# Patient Record
Sex: MALE | Race: WHITE | NOT HISPANIC OR LATINO | Employment: FULL TIME | URBAN - NONMETROPOLITAN AREA
[De-identification: names, ages, dates, MRNs, and addresses within clinical notes are randomized per-mention and may not be internally consistent; named-entity substitution may affect disease eponyms.]

---

## 2017-07-21 ENCOUNTER — AMBULATORY - GICH (OUTPATIENT)
Dept: FAMILY MEDICINE | Facility: OTHER | Age: 22
End: 2017-07-21

## 2017-07-21 DIAGNOSIS — Z23 ENCOUNTER FOR IMMUNIZATION: ICD-10-CM

## 2017-08-01 ENCOUNTER — OFFICE VISIT - GICH (OUTPATIENT)
Dept: FAMILY MEDICINE | Facility: OTHER | Age: 22
End: 2017-08-01

## 2017-08-01 ENCOUNTER — COMMUNICATION - GICH (OUTPATIENT)
Dept: FAMILY MEDICINE | Facility: OTHER | Age: 22
End: 2017-08-01

## 2017-08-01 DIAGNOSIS — K21.00 GASTRO-ESOPHAGEAL REFLUX DISEASE WITH ESOPHAGITIS: ICD-10-CM

## 2017-08-01 DIAGNOSIS — F81.9 DEVELOPMENTAL DISORDER OF SCHOLASTIC SKILLS: ICD-10-CM

## 2017-08-02 ENCOUNTER — COMMUNICATION - GICH (OUTPATIENT)
Dept: FAMILY MEDICINE | Facility: OTHER | Age: 22
End: 2017-08-02

## 2017-08-02 DIAGNOSIS — K21.00 GASTRO-ESOPHAGEAL REFLUX DISEASE WITH ESOPHAGITIS: ICD-10-CM

## 2017-08-07 ENCOUNTER — COMMUNICATION - GICH (OUTPATIENT)
Dept: SURGERY | Facility: OTHER | Age: 22
End: 2017-08-07

## 2017-08-21 ENCOUNTER — COMMUNICATION - GICH (OUTPATIENT)
Dept: SURGERY | Facility: OTHER | Age: 22
End: 2017-08-21

## 2017-08-25 ENCOUNTER — SURGERY (OUTPATIENT)
Dept: SURGERY | Facility: OTHER | Age: 22
End: 2017-08-25

## 2017-08-25 ENCOUNTER — HOSPITAL ENCOUNTER (OUTPATIENT)
Dept: SURGERY | Facility: OTHER | Age: 22
Discharge: HOME | End: 2017-08-25
Attending: SURGERY | Admitting: SURGERY

## 2017-08-25 ENCOUNTER — HISTORY (OUTPATIENT)
Dept: SURGERY | Facility: OTHER | Age: 22
End: 2017-08-25

## 2017-08-25 ENCOUNTER — AMBULATORY - GICH (OUTPATIENT)
Dept: SCHEDULING | Facility: OTHER | Age: 22
End: 2017-08-25

## 2017-08-25 DIAGNOSIS — K22.89 OTHER SPECIFIED DISEASES OF ESOPHAGUS: ICD-10-CM

## 2017-08-28 ENCOUNTER — COMMUNICATION - GICH (OUTPATIENT)
Dept: SURGERY | Facility: OTHER | Age: 22
End: 2017-08-28

## 2017-08-29 ENCOUNTER — HISTORY (OUTPATIENT)
Dept: FAMILY MEDICINE | Facility: OTHER | Age: 22
End: 2017-08-29

## 2017-08-29 ENCOUNTER — OFFICE VISIT - GICH (OUTPATIENT)
Dept: FAMILY MEDICINE | Facility: OTHER | Age: 22
End: 2017-08-29

## 2017-08-29 DIAGNOSIS — K22.2 ESOPHAGEAL OBSTRUCTION: ICD-10-CM

## 2017-08-30 ENCOUNTER — AMBULATORY - GICH (OUTPATIENT)
Dept: FAMILY MEDICINE | Facility: OTHER | Age: 22
End: 2017-08-30

## 2017-08-30 ENCOUNTER — HOSPITAL ENCOUNTER (OUTPATIENT)
Dept: RADIOLOGY | Facility: OTHER | Age: 22
End: 2017-08-30
Attending: FAMILY MEDICINE

## 2017-08-30 DIAGNOSIS — K22.2 ESOPHAGEAL OBSTRUCTION: ICD-10-CM

## 2017-08-31 ENCOUNTER — CARE COORDINATION (OUTPATIENT)
Dept: GASTROENTEROLOGY | Facility: CLINIC | Age: 22
End: 2017-08-31

## 2017-08-31 ENCOUNTER — TELEPHONE (OUTPATIENT)
Dept: GASTROENTEROLOGY | Facility: CLINIC | Age: 22
End: 2017-08-31

## 2017-08-31 NOTE — TELEPHONE ENCOUNTER
8 Pages of medical records received on 08/30/2017.  Message sent via E-mail asking for the Intake team to begin the dot phrase.     SR 08/31/2017  [id number]

## 2017-08-31 NOTE — PROGRESS NOTES
[facility] Referral       Referring provider: Dr. Colunga, [facility]     Clinic Contact: [place]  Phone: [phone]     Referred to: [facility]     Referral Received: 8/30/17    Records received: 8/30/17 - [facility] records received via care everywhere.     Images received: not as of 09/12/17    MD review date: NA    Evaluation for: Esophageal Stricture       Clinical History (per RN review of records provided): Records in Care Everywhere     - Patient has history of difficulty swallowing dating back to age 12.     - Unable to review procedure notes but required EGD for removal of foreign body in 06/2001 and a second time as well date unknown. Once for a removal of hamburger (age 12) and the second for a removal of a angeles (age 5).    - No treatment initiated for this.     - He was seen by surgeon within [facility] system in 2014 for difficulty swallowing. Recommended proceeding with EGD.     - Upper endoscopy on 9/26/2014: Completed under MAC sedation.   Procedure: After adequate sedation the patient was in the left lateral decubitus position. The esophagoscope was passed under direct vision into the esophagus and onto the second portion of the duodenum. The duodenum was unremarkable and biopsied. The antrum was with extensive petechiae. Biopsies were taken from the antrum to look for occult H. Pylori. The scope was retroflexed. The GE junction and fundus were with linear erosions . Scope was straightened and pulled back. The distal esophagus was friable and with erythema . Biopsies were taken from the distal esophagus. The remaining esophagus was with linear mucosal tear . The scope was removed.The patient tolerated the procedure well.       - patient was seen by family medicine for follow up d/t ongoing issues with epigastric pain and reflux in 08/2017. Per office note pt had stopped his PPI medication, following visit recommendation was to restart.       - Upper  Endoscopy on 8/25/17 completed with Dr. Cuellar at [facility].   Procedure:After adequate sedation, the patient was in the left lateral decubitus position. The esophagoscope was passed under direct vision into the esophagus. There was some resistence So scope was pulled back which reveled a linear mucosal tear In mid esophagus. Tear was not through muscularis which was visible. Attempt to re-approximate mucosa with a clip was not successful as the muscularis wanted to pooch through. The scope was then removed.The patient tolerated the procedure well.    Recommendations: PPI and liquid diet.     - Following scope patient was seen for follow up with PCP, he was having pain with swallowing but since starting Nexium it had resolved per note. Endoscopy provider recommended pt have consult at [place] for further evaluation of ongoing dysphagia.       Imaging:   XR Esophagus 8/30/2017:   FINDINGS: There is no evidence of esophageal mass, stricture or ulceration. No episodes of gastroesophageal reflux were witnessed. There is normal peristalsis.      MD Decision for clinic consultation/Orders:     - ok to scheduled based on diagnosis protocol for provider.     Referral updates/Patient contacted:     08/31/2017 2:48 PM - requested intake team contact patient to schedule consult. Will need to confirm where previous endoscopy procedures have been completed and obtain outside records for visit. Also requested outside esophagram is pushed to our system for visit.

## 2017-09-22 ENCOUNTER — COMMUNICATION - GICH (OUTPATIENT)
Dept: FAMILY MEDICINE | Facility: OTHER | Age: 22
End: 2017-09-22

## 2017-09-22 DIAGNOSIS — K21.00 GASTRO-ESOPHAGEAL REFLUX DISEASE WITH ESOPHAGITIS: ICD-10-CM

## 2017-09-22 DIAGNOSIS — K22.89 OTHER SPECIFIED DISEASES OF ESOPHAGUS: ICD-10-CM

## 2017-10-03 ENCOUNTER — COMMUNICATION - GICH (OUTPATIENT)
Dept: FAMILY MEDICINE | Facility: OTHER | Age: 22
End: 2017-10-03

## 2017-10-04 ENCOUNTER — AMBULATORY - GICH (OUTPATIENT)
Dept: SCHEDULING | Facility: OTHER | Age: 22
End: 2017-10-04

## 2017-10-20 ENCOUNTER — AMBULATORY - GICH (OUTPATIENT)
Dept: FAMILY MEDICINE | Facility: OTHER | Age: 22
End: 2017-10-20

## 2017-10-20 DIAGNOSIS — Z23 ENCOUNTER FOR IMMUNIZATION: ICD-10-CM

## 2017-11-28 ENCOUNTER — AMBULATORY - GICH (OUTPATIENT)
Dept: SCHEDULING | Facility: OTHER | Age: 22
End: 2017-11-28

## 2017-12-27 NOTE — PROGRESS NOTES
Patient Information     Patient Name MRAndrey Cornejo [id number] Male 1995      Progress Notes by Roberto Carlos Cuellar MD at 2017  4:15 PM     Author:  Roberto Carlos Cuellar MD Service:  (none) Author Type:  Physician     Filed:  2017  4:16 PM Date of Service:  2017  4:15 PM Status:  Signed     :  Roberto Carlos Cuellar MD (Physician)            Screening Questions for the Scheduling of Screening Colonoscopies   (If Colonoscopy is diagnostic, Provider should review the chart before scheduling.)  Are you younger than 50 or older than 80?  YES   Do you take aspirin or fish oil?  NO  (if yes, tell patient to stop 1 week prior to Colonoscopy)  Do you take warfarin (Coumadin), clopidogrel (Plavix), apixaban (Eliquis), dabigatram (Pradaxa), rivaroxaban (Xarelto) or any blood thinner? NO  Do you use oxygen at home?  NO  Do you have kidney disease? NO  Are you on dialysis? NO  Have you had a stroke or heart attack in the last year? NO  Have you had a stent in your heart or any blood vessel in the last year? NO  Have you had a transplant of any organ? NO   Have you had a colonoscopy or upper endoscopy (EGD) before? YES - EGD          When?   ? [facility]   Date of scheduled  EGD - 2017  Provider [facility]   Pharmacy

## 2017-12-28 NOTE — TELEPHONE ENCOUNTER
Patient Information     Patient Name MRN Andrey Theodore [id number] Male 1995      Telephone Encounter by Pallavi Alicia at 2017 11:43 AM     Author:  Pallavi Alicia Service:  (none) Author Type:  (none)     Filed:  2017 11:43 AM Encounter Date:  2017 Status:  Signed     :  Pallavi Alicia            Left message to call back  ....................  2017   11:43 AM  Pallavi Alicia LPN...................2017  11:43 AM

## 2017-12-28 NOTE — TELEPHONE ENCOUNTER
Patient Information     Patient Name MRN Andrey Theodore [id number] Male 1995      Telephone Encounter by Mita Roque at 2017  4:25 PM     Author:  Mita Roque Service:  (none) Author Type:  (none)     Filed:  2017  4:25 PM Encounter Date:  2017 Status:  Signed     :  Mita Roque            Left message to call back  Mita Roque LPN..............2017 4:25 PM

## 2017-12-28 NOTE — TELEPHONE ENCOUNTER
Patient Information     Patient Name MRN Andrey Theodore [id number] Male 1995      Telephone Encounter by Millicent Desir at 2017  4:24 PM     Author:  Millicent Desir Service:  (none) Author Type:  (none)     Filed:  2017  4:25 PM Encounter Date:  2017 Status:  Signed     :  Millicent Desir            Spoke with parent she said she has this taken care of. Millicent Desir LPN .......................2017  4:25 PM

## 2017-12-28 NOTE — TELEPHONE ENCOUNTER
Patient Information     Patient Name MRAndrey Cornejo [id number] Male 1995      Telephone Encounter by Nevin Butcher at 8/3/2017  9:19 AM     Author:  Nevin Butcher Service:  (none) Author Type:  (none)     Filed:  8/3/2017  9:19 AM Encounter Date:  2017 Status:  Signed     :  Nevin Butcher            Left message to call back  ....................  8/3/2017   9:19 AM

## 2017-12-28 NOTE — OR ANESTHESIA
Patient Information     Patient Name MRN Sex Andrey Terrell [id number] Male 1995      OR Anesthesia by Paul Cummins CRNA at 2017  9:36 AM     Author:  Paul Cummins CRNA Service:  (none) Author Type:  NURS- Nurse Anesthetist     Filed:  2017  9:36 AM Date of Service:  2017  9:36 AM Status:  Signed     :  Paul Cummins CRNA (NURS- Nurse Anesthetist)                                                           ANESTHESIA ASSESSMENT    Date: 17 Time: 9:36 AM      Patient:  Andrey Sylvester    Procedure(s) (LRB):  ESOPHAGOGASTRODUODENOSCOPY (N/A)    Past Medical History:     Diagnosis  Date     LEARNING DISABILITY     Special ed for learning disorder         Past Surgical History:      Procedure  Laterality Date     ESOPHAGOGASTRODUODENOSCOPY      To remove anglees from esophagus       ESOPHAGOGASTRODUODENOSCOPY      HAMBURGER REMOVAL        ESOPHAGOGASTRODUODENOSCOPY  14    EGD       HERNIA REPAIR      Umbilical         No family history on file.    Patient Active Problem List     Diagnosis  Code     LUMBAR STRAIN S33.5XXA     Learning disabilities F81.9     ARTHRALGIA, BOTH WRISTS M25.539     Dysphagia R13.10     Gastritis K29.70     Esophagitis, reflux K21.0     Chronic back pain greater than 3 months duration M54.9, G89.29     Degeneration of intervertebral disc of lumbar region M51.36     Epigastric pain R10.13       Prescriptions Prior to Admission       Medication  Sig Dispense Refill     omeprazole (PRILOSEC) 40 mg Delayed-Release capsule Take 1 capsule by mouth once daily. OTC 90 capsule 3     Patients Unique Medication From Home Over counter relief  Chew johanny nicole by [employer]         Allergies:No Known Allergies    Review of Systems:  GERD: Yes (not currently having)  Chest pain: No  Shortness of breath: No  Recent fever: No  Poor exercise tolerance: No  Bleeding tendency: No  Pregnant: No  Anesthesia Complications: None      History    Smoking  Status      Never Smoker   Smokeless Tobacco      Never Used     Social History     Social History        Marital status:  Single     Spouse name: N/A     Number of children:  N/A     Years of education:  N/A     Social History Main Topics       Smoking status: Never Smoker     Smokeless tobacco: Never Used     Alcohol use Not on file     Drug use: Not on file     Sexual activity: Not on file     Other Topics  Concern     Not on file      Social History Narrative     Lives with his mother and at other times with his grandparents. ~Charity Sylvester Mother        Preloaded.  1/25/2013               Physical Examination:  /78  Pulse 72  Temp 98.8  F (37.1  C)  Resp 18  SpO2 100% There is no height or weight on file to calculate BMI. There is no height or weight on file to calculate BSA.  Dental Condition: Good     Mallampati Score (Airway): I  Cardiovascular: Normal  Pulmonary: Normal  Other: (not recorded)    Recent Labs in [facility]:    No results for input(s): SODIUM, POTASSIUM, CHLORIDE, SZ2LNGZI, ANIONGAP, BUN, CREATININE, BUNCREARATIO, CALCIUM, GLUCOSE, GLUCOSEMETER, KETONES, MAGNESIUM, WBC, HGB, HCT, PLT, ABORH, RHTYPE, PREGURINE, BHCGQL, HCGBETAQUANT, INR in the last 72 hours.          Assessment/Plan:  ASA Class: I  Risk of dental injury discussed: Yes  NPO status confirmed: Yes  Anesthetic Plan: MAC  Risk/Benefit/Alt discussed: Yes  Questions answered: Yes  Emergency Case?: No  Labs/ECG/Radiology Reviewed?: Yes      H&P Reviewed.  Patient Examined.      Provider Electronic Signature:  Paul Cummins CRNA

## 2017-12-28 NOTE — TELEPHONE ENCOUNTER
Patient Information     Patient Name MRN Andrey Theodore [id number] Male 1995      Telephone Encounter by Irasema King at 10/4/2017 12:13 PM     Author:  Irasema King Service:  (none) Author Type:  (none)     Filed:  10/4/2017 12:13 PM Encounter Date:  10/3/2017 Status:  Signed     :  Irasema King            PA was done and faxed  Irasema King ....................  10/4/2017   12:13 PM

## 2017-12-28 NOTE — PROGRESS NOTES
"Patient Information     Patient Name MRN Sex Andrey Terrell [id number] Male 1995      Progress Notes by Didier Colunga MD at 2017  3:15 PM     Author:  Didier Colunga MD Service:  (none) Author Type:  Physician     Filed:  2017  3:51 PM Encounter Date:  2017 Status:  Signed     :  Didier Colunga MD (Physician)            SUBJECTIVE:    Andrey Sylvester is a 22 y.o. male who presents for abdomen pain and gastroesophogeal reflux disease     HPI    Here by himself.  He is now working at [employer].  Had been on medications for 1 year or so, stopped it and is just on alkaseltzer now.  He continues to have gastroesophogeal reflux disease symptoms perhaps 2 times a week.  Had an EGD done and reports he had esophagitis at the time.  His mom has encouraged him to have a repeat to make sure it has healed.  Per our records his last EGD was .  He does not exactly recall the specifics, has a moderate learning disability.  Still will get spells of \"bad\" pain in epigastrium, perhaps every other month or so for a few weeks.  Some foods help, like low Sodium foods.  Worsened by processed foods, hot dogs and fast food.  Will then also get diarrhea      No Known Allergies,   Current Outpatient Prescriptions on File Prior to Visit       Medication  Sig Dispense Refill     famotidine (PEPCID) 40 mg tablet Take 1 tablet by mouth at bedtime.  0     No current facility-administered medications on file prior to visit.    ,   Past Medical History:     Diagnosis  Date     LEARNING DISABILITY     Special ed for learning disorder      and   Past Surgical History:      Procedure  Laterality Date     ESOPHAGOGASTRODUODENOSCOPY      To remove angeles from esophagus       ESOPHAGOGASTRODUODENOSCOPY      HAMBURGER REMOVAL        ESOPHAGOGASTRODUODENOSCOPY  15    EGD       HERNIA REPAIR      Umbilical         REVIEW OF SYSTEMS:  Review of Systems   Constitutional: Negative for chills and fever.   Respiratory: " "Negative for cough and shortness of breath.    Cardiovascular: Negative for chest pain and palpitations.   Gastrointestinal: Positive for abdominal pain, diarrhea and heartburn. Negative for constipation, nausea and vomiting.   Neurological: Negative for headaches.       OBJECTIVE:  /64  Pulse 80  Ht 1.854 m (6' 1\")  Wt 85.9 kg (189 lb 6 oz)  BMI 24.99 kg/m2    EXAM:   Physical Exam   Constitutional: He is well-developed, well-nourished, and in no distress. No distress.   HENT:   Head: Normocephalic and atraumatic.   Cardiovascular: Normal rate, regular rhythm and normal heart sounds.  Exam reveals no gallop and no friction rub.    No murmur heard.  Pulmonary/Chest: Effort normal and breath sounds normal. No respiratory distress. He has no wheezes. He has no rales.   Abdominal: Soft. He exhibits no distension. There is no tenderness. There is no rebound and no guarding.   Skin: Skin is warm and dry. No rash noted. He is not diaphoretic. No erythema.   Psychiatric: Affect and judgment normal.       ASSESSMENT/PLAN:    ICD-10-CM    1. Esophagitis, reflux K21.0 GASTROSCOPY/EGD - [facility]   2. Learning disabilities F81.9         Plan:  I looked over the report from his last EGD, 3 years ago.  Symptoms continue so will repeat this and also have him resume PROTON PUMP INHIBITOR treatment until it is done.  Discussed with him diet changes.  His grandmother cooks for him, as he lives with her.  Also limit EtOH.  Didier Colunga MD ....................  8/1/2017   3:50 PM            "

## 2017-12-28 NOTE — OR SURGEON
Patient Information     Patient Name MRN Andrey Theodore [id number] Male 1995      OR Surgeon by Roberto Carlos Cuellar MD at 2017 10:08 AM     Author:  Roberto Carlos Cuellar MD Service:  (none) Author Type:  Physician     Filed:  2017 10:14 AM Date of Service:  2017 10:08 AM Status:  Signed     :  Roberto Carlos Cuellar MD (Physician)            Procedure note  Date of service: 2017    Preoperative diagnosis:Epigastric pain and GERD  Postoperative diagnosis: Esophageal mucosal tear    Procedure:  Attempted EGD    Anesthesia:   GABBY Cummins CRNA    Indication for the procedure:This is a 22 y.o. male with h/o gastritis and GERD with worsening symptoms.  After explaining the risks to include bleeding, aspiration, perforation, the patient wished to proceed.    Procedure:After adequate sedation, the patient was in the left lateral decubitus position.  The esophagoscope was passed under direct vision into the esophagus. There was some resistence  So scope was pulled back which reveled a  linear mucosal tear  In mid esophagus. Tear was not through muscularis which was visible. Attempt to re-approximate mucosa with a clip was not successful as the muscularis wanted to pooch through.   The scope was then  removed.The patient tolerated the procedure well.    Recommendations:    PPI and liquid diet.     Surgeon: Roberto Carlos Cuellar MD [facility]    Referring physician:  Didier Colunga MD

## 2017-12-28 NOTE — TELEPHONE ENCOUNTER
Patient Information     Patient Name MRN Andrey Theodore [id number] Male 1995      Telephone Encounter by Didier Colunga MD at 2017  3:06 PM     Author:  Didier Colunga MD Service:  (none) Author Type:  Physician     Filed:  2017  3:06 PM Encounter Date:  2017 Status:  Signed     :  Didier Colunga MD (Physician)            Tyrone patient, needs to see the specialist based on Dr. Cuellar's procedure, even with a normal study.  Also, tell him he will have to sign the release yearly.  Didier Colunga MD ....................  2017   3:06 PM

## 2017-12-28 NOTE — TELEPHONE ENCOUNTER
Patient Information     Patient Name MRN Andrey Theodore [id number] Male 1995      Telephone Encounter by Fadia Nick at 2017  1:10 PM     Author:  Fadia Nick Service:  (none) Author Type:  (none)     Filed:  2017  1:10 PM Encounter Date:  2017 Status:  Signed     :  Fadia Nick            2nd phone note opened for this referral.  Fadia Nick LPN....................2017 1:10 PM

## 2017-12-28 NOTE — TELEPHONE ENCOUNTER
Patient Information     Patient Name MRN Andrey Theodore [id number] Male 1995      Telephone Encounter by Irasema King at 2017 11:38 AM     Author:  Irasema King Service:  (none) Author Type:  (none)     Filed:  2017 11:44 AM Encounter Date:  2017 Status:  Signed     :  Irasema King            Radiology doctor told Pt that his study looked fine and he asked why the patient needs to go to the  of . Parent did not know the name of the surgeon who saw something, wondering why. really doesn't need to go

## 2017-12-28 NOTE — TELEPHONE ENCOUNTER
Patient Information     Patient Name MRN Andrey Theodore [id number] Male 1995      Telephone Encounter by Tammie Lara at 2017 12:43 PM     Author:  Tammie Lara Service:  (none) Author Type:  (none)     Filed:  2017 12:44 PM Encounter Date:  2017 Status:  Signed     :  Tammie Lara            Patient referred by Dr. Colunga for a EDG, Diagnosis is esophagitis , reflux .   Please advise. Thank You

## 2017-12-28 NOTE — TELEPHONE ENCOUNTER
Patient Information     Patient Name MRN Andrey Theodore [id number] Male 1995      Telephone Encounter by Millicent Desir at 2017 10:29 AM     Author:  Millicent Desir Service:  (none) Author Type:  (none)     Filed:  2017 10:43 AM Encounter Date:  2017 Status:  Signed     :  Millicent Desir            Spoke with patient he would like a refill of liquid antacid. Spoke with pharmacist at [facility], patient was given omeprazole capsules with directions to open capsule and put in liquid. A PA was completed for this medication. Did leave mom a message for a return call. Patient is not yet due for refill.  Millicent Desir LPN .......................2017  10:42 AM

## 2017-12-28 NOTE — TELEPHONE ENCOUNTER
Patient Information     Patient Name MRN Andrey Theodore [id number] Male 1995      Telephone Encounter by Pallavi Alicia at 2017  2:27 PM     Author:  Pallavi Alicia Service:  (none) Author Type:  (none)     Filed:  2017  2:33 PM Encounter Date:  2017 Status:  Signed     :  Pallavi Alicia            Patient's mother calling in regards to letter that they received stating his swallow study was normal. She is wondering why patient still needs to see a specialist. I stated that we have nothing of file stating it is okay for us to speak with her about his medical care because he is over the age of 19. I stated I can ask Dr. Colunga, however I will have to contact the patient to ask if it is okay that I speak with her. She argued that he has done this before and it should be on file. Unfortunately, I can not find anything and will proceed with calling patient to ask permission.    Pallavi Alicia LPN...................2017  2:33 PM

## 2017-12-28 NOTE — OR POSTOP
Patient Information     Patient Name MRN Sex Andrey Terrell [id number] Male 1995      OR PostOp by Silvia Garcia RN at 2017 11:33 AM     Author:  Silvia Garcia RN Service:  (none) Author Type:  NURS- Registered Nurse     Filed:  2017 11:34 AM Date of Service:  2017 11:33 AM Status:  Signed     :  Silvia Garcia RN (NURS- Registered Nurse)            Discharge Note    Data:  Andrey Sylvester has been discharged home at 1115 via ambulatory accompanied by Registered Nurse and Family.      Action:  Written discharge/follow-up instructions were provided to patient and Mother. Prescriptions were sent to patients pharmacy.  Belongings sent with patient. Medications from home sent with patient/family: Not Applicable  Equipment none .     Response:  Patient and Mother verbalized understanding of discharge instructions, reason for discharge, and necessary follow-up appointments.

## 2017-12-28 NOTE — INTERVAL H&P NOTE
"Patient Information     Patient Name MRN Andrey Theodore [id number] Male 1995      Interval H&P Note by Roberto Carlos Cuellar MD at 2017  9:21 AM     Author:  Roberto Carlos Cuellar MD Service:  (none) Author Type:  Physician     Filed:  2017  9:21 AM Date of Service:  2017  9:21 AM Status:  Signed     :  Roberto Carlos Cuellar MD (Physician)            History and Physical Update    The history and physical has been reviewed and the patient has been examined.  There are no interim changes to the patient's history or physical condition.    Roberto Carlos Cuellar MD        Source Note     Author:  Roberto Carlos Cuellar MD Service:  (none) Author Type:  Physician    Filed:  2017  4:16 PM Date of Service:  2017  3:15 PM Status:  Signed    :  Roberto Carlos Cuellar MD (Physician)              SUBJECTIVE:    Andrye Sylvester is a 22 y.o. male who presents for abdomen pain and gastroesophogeal reflux disease     HPI    Here by himself.  He is now working at [employer].  Had been on medications for 1 year or so, stopped it and is just on alkaseltzer now.  He continues to have gastroesophogeal reflux disease symptoms perhaps 2 times a week.  Had an EGD done and reports he had esophagitis at the time.  His mom has encouraged him to have a repeat to make sure it has healed.  Per our records his last EGD was .  He does not exactly recall the specifics, has a moderate learning disability.  Still will get spells of \"bad\" pain in epigastrium, perhaps every other month or so for a few weeks.  Some foods help, like low Sodium foods.  Worsened by processed foods, hot dogs and fast food.  Will then also get diarrhea      No Known Allergies,   Current Outpatient Prescriptions on File Prior to Visit       Medication  Sig Dispense Refill     famotidine (PEPCID) 40 mg tablet Take 1 tablet by mouth at bedtime.  0     No current facility-administered medications on file prior to visit.    ,   Past Medical History:     Diagnosis  Date     " "LEARNING DISABILITY     Special ed for learning disorder      and   Past Surgical History:      Procedure  Laterality Date     ESOPHAGOGASTRODUODENOSCOPY      To remove angeles from esophagus       ESOPHAGOGASTRODUODENOSCOPY      HAMBURGER REMOVAL        ESOPHAGOGASTRODUODENOSCOPY  9/26/14    EGD       HERNIA REPAIR      Umbilical         REVIEW OF SYSTEMS:  Review of Systems   Constitutional: Negative for chills and fever.   Respiratory: Negative for cough and shortness of breath.    Cardiovascular: Negative for chest pain and palpitations.   Gastrointestinal: Positive for abdominal pain, diarrhea and heartburn. Negative for constipation, nausea and vomiting.   Neurological: Negative for headaches.       OBJECTIVE:  /64  Pulse 80  Ht 1.854 m (6' 1\")  Wt 85.9 kg (189 lb 6 oz)  BMI 24.99 kg/m2    EXAM:   Physical Exam   Constitutional: He is well-developed, well-nourished, and in no distress. No distress.   HENT:   Head: Normocephalic and atraumatic.   Cardiovascular: Normal rate, regular rhythm and normal heart sounds.  Exam reveals no gallop and no friction rub.    No murmur heard.  Pulmonary/Chest: Effort normal and breath sounds normal. No respiratory distress. He has no wheezes. He has no rales.   Abdominal: Soft. He exhibits no distension. There is no tenderness. There is no rebound and no guarding.   Skin: Skin is warm and dry. No rash noted. He is not diaphoretic. No erythema.   Psychiatric: Affect and judgment normal.       ASSESSMENT/PLAN:    ICD-10-CM    1. Esophagitis, reflux K21.0 GASTROSCOPY/EGD - [facility]   2. Learning disabilities F81.9         Plan:  I looked over the report from his last EGD, 3 years ago.  Symptoms continue so will repeat this and also have him resume PROTON PUMP INHIBITOR treatment until it is done.  Discussed with him diet changes.  His grandmother cooks for him, as he lives with her.  Also limit EtOH.  Didier Colunga MD ....................  8/1/2017   3:50 PM                "

## 2017-12-28 NOTE — TELEPHONE ENCOUNTER
Patient Information     Patient Name MRN Andrey Theodore [id number] Male 1995      Telephone Encounter by Irasema King at 2017 10:03 AM     Author:  Irasema King Service:  (none) Author Type:  (none)     Filed:  2017 10:04 AM Encounter Date:  2017 Status:  Signed     :  Irasema King            Calling and asking why he would need to go to the [facility], and needs refill of acid reflux in a liquid  Form  Irasema King ....................  2017   10:04 AM

## 2017-12-28 NOTE — OR ANESTHESIA
Patient Information     Patient Name MRN Sex Andrey Terrell [id number] Male 1995      OR Anesthesia by Paul Cummins CRNA at 2017 11:36 AM     Author:  Paul Cummins CRNA Service:  (none) Author Type:  NURS- Nurse Anesthetist     Filed:  2017 11:36 AM Date of Service:  2017 11:36 AM Status:  Signed     :  Paul Cummins CRNA (NURS- Nurse Anesthetist)            Anesthesia Post Operative Care Note    Name: Andrey Sylvester  MRN:   [id number]  :    1995       Procedure Done:  See Surgeon Note   Case Cancelled for Anesthetic Reason:  No      Anesthesia Technique    Anesthetic Type:  MAC       MAC Type:  NC     Oral Trauma:  No    Intraoperative Course   Hemodynamics:  Stable    Ventilation Normal:  Yes Lung Sounds:  Normal      PACU Course        Nondepolarizer Used:       Reversed: N/A   Hemodynamics:  Stable      Hydration: Euvolemic   Temperature:  36.1 - 38.3      Mental Status:  Awake, alert, follows commands   Pain Management:  Adequate   Regional Block:  No   Anesthesia Complications:  None      Vital Signs:  Temp: 98.6  F (37  C)  Pulse: 74  BP: 151/92  Resp: 16  SpO2: 96 %                       Active Lines:  Patient Lines/Drains/Airways Status    Active Line     Name: Placement date: Placement time: Site: Days:    PERIPHERAL VAD Left Hand 17   0940   Hand   less than 1                Intake & Output:       Labs:  No results for input(s): GW3POAXLAXY, MMR3OAEWHEVU, PHARTERIAL, QPE0YQZWMLOX, Y4GHTKBCTUDG in the last 24 hours.    No results for input(s): MAGNESIUM in the last 24 hours.    No results for input(s): GLUCOSEMETER in the last 720 hours.        Paul Cummins CRNA ....................  2017   11:36 AM

## 2017-12-28 NOTE — PROGRESS NOTES
Patient Information     Patient Name MRN Sex Andrey Terrell [id number] Male 1995      Progress Notes by Didier Colunga MD at 2017 10:15 AM     Author:  Didier Colunga MD Service:  (none) Author Type:  Physician     Filed:  2017 10:36 AM Encounter Date:  2017 Status:  Signed     :  Didier Colunga MD (Physician)            SUBJECTIVE:    Andrey Sylvester is a 22 y.o. male who presents for follow up EGD     HPI    Here with his mom.  He says since the scope he has had mild pain with swallowing.  No carla sticking of food.  Has daily burning pain.  since starting the Nexium the gastroesophogeal reflux disease pain has resolved.  At age 14 was having pains, avoided lots of foods and lost weight.  Had his fist EGD then.  Mom wonders if this might be something that has been present ever longer.  He had swallowed a angeles at age 5, which was removed with a scope.  At age 12 while eating burger he had a choking spell.  This too had to be physically removed with a scope.  It seems the gastroesophogeal reflux disease burning symptoms came on later, at age 14 or so.  Since then he still needs his pills crushed, has to drink large amounts of water with meals and eats soft foods almost exclusively. Mom says he has been losing weight again.    He had a follow up EGD last week, and the mucosa was torn by the scope.  Dr. Cuellar was not able to complete the scope, attempted to clip it, but was not able to do this.  He advised the patient have a GI consult at a [facility] center.    No Known Allergies,   Current Outpatient Prescriptions on File Prior to Visit       Medication  Sig Dispense Refill     Esomeprazole Magnesium 40 mg grps Omeprazole or Esomeprazole 40 mg suspension  PO daily 30 Each 1     Patients Unique Medication From Home Over counter relief  Chew johanny nicole by [employer]       No current facility-administered medications on file prior to visit.    ,   Past Medical History:      Diagnosis  Date     LEARNING DISABILITY     Special ed for learning disorder      and   Past Surgical History:      Procedure  Laterality Date     ESOPHAGOGASTRODUODENOSCOPY      To remove angeles from esophagus       ESOPHAGOGASTRODUODENOSCOPY      HAMBURGER REMOVAL        ESOPHAGOGASTRODUODENOSCOPY  9/26/14    EGD       ESOPHAGOGASTRODUODENOSCOPY  08/25/2017    Attempted EGDstopped for esophageal mucosal tear       HERNIA REPAIR      Umbilical         REVIEW OF SYSTEMS:  Review of Systems   Constitutional: Positive for weight loss.   Gastrointestinal: Positive for abdominal pain and heartburn.   Neurological: Negative for dizziness.       OBJECTIVE:  /60  Resp 16  Wt 86.3 kg (190 lb 3.2 oz)  BMI 25.09 kg/m2    EXAM:   Physical Exam   Constitutional: He is oriented to person, place, and time. No distress.   Pulmonary/Chest: Effort normal and breath sounds normal. No respiratory distress. He has no wheezes. He has no rales.   Abdominal: Soft. He exhibits no distension. There is no tenderness. There is no rebound and no guarding.   Neurological: He is alert and oriented to person, place, and time.   Skin: He is not diaphoretic.       ASSESSMENT/PLAN:    ICD-10-CM    1. Esophageal stricture K22.2 XR ESOPHAGUS      AMB CONSULT TO GASTROENTEROLOGY        Plan:  It is possible this is a stricture from birth, given the long standing history of several choking spells.  Perhaps has a stricture from birth and not  Schatzki's ring.  Might even have a combination of both.  Will arrange for a GI consult at the [facility].  20/25 minutes in counseling with him and his mom over the possible causes.  She is worried about cancer, which would be very rare at his age, or given 18 years of possible dysphagia.    Didier Colunga MD ....................  8/29/2017   10:33 AM

## 2017-12-28 NOTE — TELEPHONE ENCOUNTER
Patient Information     Patient Name MRN Andrey Theodore E [id number] Male 1995      Telephone Encounter by Jodie Morrison at 2017  2:50 PM     Author:  Jodie Morrison Service:  (none) Author Type:  (none)     Filed:  2017  2:56 PM Encounter Date:  2017 Status:  Signed     :  Jodie Morrison            Omeprazole 40 mg is not cover by insurance company. However Omeprazole 20 mg 2 tabs daily would be covered. Medication americo ed-up.   Jodie Morrison LPN ..........2017 2:55 PM

## 2017-12-28 NOTE — TELEPHONE ENCOUNTER
Patient Information     Patient Name MRN Andrey Theodore E [id number] Male 1995      Telephone Encounter by Roberto Carlos Cuellar MD at 2017  1:25 PM     Author:  Roberto Carlos Cuellar MD Service:  (none) Author Type:  Physician     Filed:  2017  1:25 PM Encounter Date:  2017 Status:  Signed     :  Roberto Carlos Cuellar MD (Physician)            Schedule EGD for epigastric pain

## 2017-12-28 NOTE — TELEPHONE ENCOUNTER
Patient Information     Patient Name MRN Andrey Theodore [id number] Male 1995      Telephone Encounter by Didier Colunga MD at 2017 12:45 PM     Author:  Didier Colunga MD Service:  (none) Author Type:  Physician     Filed:  2017 12:47 PM Encounter Date:  2017 Status:  Signed     :  Didier Colunga MD (Physician)            I am just going by what Dr. Cuellar advised, based on what he saw on the scope.  The test done by the radiologist was to look for narrowing, but not the reason why his esophagus tore so easily.  If they do not want to go to [facility], I can arrange something in [place], or he can come back and talk to me about all of this.  Should bring his mom with.  Didier Colunga MD ....................  2017   12:46 PM

## 2017-12-28 NOTE — TELEPHONE ENCOUNTER
Patient Information     Patient Name MRN Andrey Theodore [id number] Male 1995      Telephone Encounter by Irasema King at 2017  9:58 AM     Author:  Irasema King Service:  (none) Author Type:  (none)     Filed:  2017  9:59 AM Encounter Date:  2017 Status:  Signed     :  Irasema King            They would like to go to [place] for Pt's appointment  Irasema King ....................  2017   9:58 AM

## 2017-12-28 NOTE — H&P
"Patient Information     Patient Name MRN Sex Andrey Terrell [id number] Male 1995      H&P (View-Only) by Roberto Carlos Cuellar MD at 2017  3:15 PM     Author:  Roberto Carlos Cuellar MD Service:  (none) Author Type:  Physician     Filed:  2017  4:16 PM Date of Service:  2017  3:15 PM Status:  Signed     :  Roberto Carlos Cuellar MD (Physician)            SUBJECTIVE:    Andrey Sylvester is a 22 y.o. male who presents for abdomen pain and gastroesophogeal reflux disease     HPI    Here by himself.  He is now working at [employer].  Had been on medications for 1 year or so, stopped it and is just on alkaseltzer now.  He continues to have gastroesophogeal reflux disease symptoms perhaps 2 times a week.  Had an EGD done and reports he had esophagitis at the time.  His mom has encouraged him to have a repeat to make sure it has healed.  Per our records his last EGD was .  He does not exactly recall the specifics, has a moderate learning disability.  Still will get spells of \"bad\" pain in epigastrium, perhaps every other month or so for a few weeks.  Some foods help, like low Sodium foods.  Worsened by processed foods, hot dogs and fast food.  Will then also get diarrhea      No Known Allergies,   Current Outpatient Prescriptions on File Prior to Visit       Medication  Sig Dispense Refill     famotidine (PEPCID) 40 mg tablet Take 1 tablet by mouth at bedtime.  0     No current facility-administered medications on file prior to visit.    ,   Past Medical History:     Diagnosis  Date     LEARNING DISABILITY     Special ed for learning disorder      and   Past Surgical History:      Procedure  Laterality Date     ESOPHAGOGASTRODUODENOSCOPY      To remove angeles from esophagus       ESOPHAGOGASTRODUODENOSCOPY      HAMBURGER REMOVAL        ESOPHAGOGASTRODUODENOSCOPY  14    EGD       HERNIA REPAIR      Umbilical         REVIEW OF SYSTEMS:  Review of Systems   Constitutional: Negative for chills and fever.   Respiratory: " "Negative for cough and shortness of breath.    Cardiovascular: Negative for chest pain and palpitations.   Gastrointestinal: Positive for abdominal pain, diarrhea and heartburn. Negative for constipation, nausea and vomiting.   Neurological: Negative for headaches.       OBJECTIVE:  /64  Pulse 80  Ht 1.854 m (6' 1\")  Wt 85.9 kg (189 lb 6 oz)  BMI 24.99 kg/m2    EXAM:   Physical Exam   Constitutional: He is well-developed, well-nourished, and in no distress. No distress.   HENT:   Head: Normocephalic and atraumatic.   Cardiovascular: Normal rate, regular rhythm and normal heart sounds.  Exam reveals no gallop and no friction rub.    No murmur heard.  Pulmonary/Chest: Effort normal and breath sounds normal. No respiratory distress. He has no wheezes. He has no rales.   Abdominal: Soft. He exhibits no distension. There is no tenderness. There is no rebound and no guarding.   Skin: Skin is warm and dry. No rash noted. He is not diaphoretic. No erythema.   Psychiatric: Affect and judgment normal.       ASSESSMENT/PLAN:    ICD-10-CM    1. Esophagitis, reflux K21.0 GASTROSCOPY/EGD - [facility]   2. Learning disabilities F81.9         Plan:  I looked over the report from his last EGD, 3 years ago.  Symptoms continue so will repeat this and also have him resume PROTON PUMP INHIBITOR treatment until it is done.  Discussed with him diet changes.  His grandmother cooks for him, as he lives with her.  Also limit EtOH.  Didier Colunga MD ....................  8/1/2017   3:50 PM              "

## 2017-12-28 NOTE — TELEPHONE ENCOUNTER
Patient Information     Patient Name MRN Andrey Theodore E [id number] Male 1995      Telephone Encounter by Fadia Nick at 2017  3:06 PM     Author:  Fadia Nick Service:  (none) Author Type:  (none)     Filed:  2017  3:06 PM Encounter Date:  2017 Status:  Signed     :  Fadia Nick            Left message to call back  Fadia Nick LPN ...................  2017   3:06 PM

## 2017-12-28 NOTE — TELEPHONE ENCOUNTER
Patient Information     Patient Name MRN Andrey Theodore [id number] Male 1995      Telephone Encounter by Pallavi Alicia at 2017  3:41 PM     Author:  Pallavi Alicia Service:  (none) Author Type:  (none)     Filed:  2017  3:41 PM Encounter Date:  2017 Status:  Signed     :  Pallavi Alicia            Left message to call back  ....................  2017   3:41 PM  Pallavi Alicia LPN...................2017  3:41 PM

## 2017-12-28 NOTE — TELEPHONE ENCOUNTER
Patient Information     Patient Name MRN Andrey Theodore [id number] Male 1995      Telephone Encounter by Didier Colunga MD at 2017 10:07 AM     Author:  Didier Colunga MD Service:  (none) Author Type:  Physician     Filed:  2017 10:08 AM Encounter Date:  2017 Status:  Signed     :  Didier Colunga MD (Physician)            The surgeon advised the [facility], based on what he saw with the scope.  If this is not possible, we can arrange something in [place].  Didier Colunga MD ....................  2017   10:08 AM

## 2017-12-28 NOTE — OR NURSING
Patient Information     Patient Name MRN Kimberly Sylvester, Andrey E [id number] Male 1995      OR Nursing by Fadia Bermudez RN at 2017  9:49 AM     Author:  Fadia Bermudez RN Service:  (none) Author Type:  NURS- Registered Nurse     Filed:  2017  9:49 AM Date of Service:  2017  9:49 AM Status:  Signed     :  Fadia Bermudez RN (NURS- Registered Nurse)            Received preop report from Cassidy Walsh RN.

## 2017-12-29 NOTE — H&P
"Patient Information     Patient Name MRN Andrey Theodore [id number] Male 1995      H&P by Roberto Carlos Cuellar MD at 2017  3:15 PM     Author:  Roberto Carlos Cuellar MD Service:  (none) Author Type:  Physician     Filed:  2017  4:16 PM Encounter Date:  2017 Status:  Signed     :  Roberto Carlos Cuellar MD (Physician)            SUBJECTIVE:    Andrey Sylvester is a 22 y.o. male who presents for abdomen pain and gastroesophogeal reflux disease     HPI    Here by himself.  He is now working at [employer].  Had been on medications for 1 year or so, stopped it and is just on alkaseltzer now.  He continues to have gastroesophogeal reflux disease symptoms perhaps 2 times a week.  Had an EGD done and reports he had esophagitis at the time.  His mom has encouraged him to have a repeat to make sure it has healed.  Per our records his last EGD was .  He does not exactly recall the specifics, has a moderate learning disability.  Still will get spells of \"bad\" pain in epigastrium, perhaps every other month or so for a few weeks.  Some foods help, like low Sodium foods.  Worsened by processed foods, hot dogs and fast food.  Will then also get diarrhea      No Known Allergies,   Current Outpatient Prescriptions on File Prior to Visit       Medication  Sig Dispense Refill     famotidine (PEPCID) 40 mg tablet Take 1 tablet by mouth at bedtime.  0     No current facility-administered medications on file prior to visit.    ,   Past Medical History:     Diagnosis  Date     LEARNING DISABILITY     Special ed for learning disorder      and   Past Surgical History:      Procedure  Laterality Date     ESOPHAGOGASTRODUODENOSCOPY      To remove angeles from esophagus       ESOPHAGOGASTRODUODENOSCOPY      HAMBURGER REMOVAL        ESOPHAGOGASTRODUODENOSCOPY  14    EGD       HERNIA REPAIR      Umbilical         REVIEW OF SYSTEMS:  Review of Systems   Constitutional: Negative for chills and fever.   Respiratory: Negative for cough and " "shortness of breath.    Cardiovascular: Negative for chest pain and palpitations.   Gastrointestinal: Positive for abdominal pain, diarrhea and heartburn. Negative for constipation, nausea and vomiting.   Neurological: Negative for headaches.       OBJECTIVE:  /64  Pulse 80  Ht 1.854 m (6' 1\")  Wt 85.9 kg (189 lb 6 oz)  BMI 24.99 kg/m2    EXAM:   Physical Exam   Constitutional: He is well-developed, well-nourished, and in no distress. No distress.   HENT:   Head: Normocephalic and atraumatic.   Cardiovascular: Normal rate, regular rhythm and normal heart sounds.  Exam reveals no gallop and no friction rub.    No murmur heard.  Pulmonary/Chest: Effort normal and breath sounds normal. No respiratory distress. He has no wheezes. He has no rales.   Abdominal: Soft. He exhibits no distension. There is no tenderness. There is no rebound and no guarding.   Skin: Skin is warm and dry. No rash noted. He is not diaphoretic. No erythema.   Psychiatric: Affect and judgment normal.       ASSESSMENT/PLAN:    ICD-10-CM    1. Esophagitis, reflux K21.0 GASTROSCOPY/EGD - [facility]   2. Learning disabilities F81.9         Plan:  I looked over the report from his last EGD, 3 years ago.  Symptoms continue so will repeat this and also have him resume PROTON PUMP INHIBITOR treatment until it is done.  Discussed with him diet changes.  His grandmother cooks for him, as he lives with her.  Also limit EtOH.  Didier Colunga MD ....................  8/1/2017   3:50 PM              "

## 2017-12-30 NOTE — NURSING NOTE
Patient Information     Patient Name MRN Andrey Theodore E [id number] Male 1995      Nursing Note by Ryanne Quevedo at 2017  3:45 PM     Author:  Ryanne Quevedo Service:  (none) Author Type:  NURS- Student Nurse     Filed:  2017  3:47 PM Encounter Date:  2017 Status:  Signed     :  Ryanne Quevedo (NURS- Student Nurse)            Patient denies allergies or past reactions to past vaccinations. Patient reports works at [employer] no current injury, put for preventative measures. Ryanne Quevedo RN ....................  2017   3:43 PM

## 2017-12-30 NOTE — NURSING NOTE
Patient Information     Patient Name MRN Andrey Theodore [id number] Male 1995      Nursing Note by Irasema King at 2017 10:15 AM     Author:  Irasema King Service:  (none) Author Type:  (none)     Filed:  2017 10:15 AM Encounter Date:  2017 Status:  Signed     :  Irasema King            Coming in for a f/u on a scope done by Dr Cuellar, referral to specialist  Irasema King ....................  2017   10:08 AM

## 2017-12-30 NOTE — NURSING NOTE
Patient Information     Patient Name MRN Andrey Theodore [id number] Male 1995      Nursing Note by Rosalba Alfaro at 2017  3:15 PM     Author:  Rosalba Alfaro Service:  (none) Author Type:  (none)     Filed:  2017  3:29 PM Encounter Date:  2017 Status:  Signed     :  Rosalba Alfaro            Patient presents today for reflux.  Rosalba Alfaro LPN...............2017   3:25 PM

## 2018-01-26 VITALS
WEIGHT: 190.2 LBS | DIASTOLIC BLOOD PRESSURE: 60 MMHG | RESPIRATION RATE: 16 BRPM | SYSTOLIC BLOOD PRESSURE: 122 MMHG | BODY MASS INDEX: 25.09 KG/M2

## 2018-01-26 VITALS
SYSTOLIC BLOOD PRESSURE: 102 MMHG | WEIGHT: 189.38 LBS | BODY MASS INDEX: 25.1 KG/M2 | DIASTOLIC BLOOD PRESSURE: 64 MMHG | HEIGHT: 73 IN | HEART RATE: 80 BPM

## 2018-03-08 ENCOUNTER — DOCUMENTATION ONLY (OUTPATIENT)
Dept: FAMILY MEDICINE | Facility: OTHER | Age: 23
End: 2018-03-08

## 2018-03-15 ENCOUNTER — TELEPHONE (OUTPATIENT)
Dept: FAMILY MEDICINE | Facility: OTHER | Age: 23
End: 2018-03-15

## 2018-03-15 DIAGNOSIS — Z91.09 ENVIRONMENTAL ALLERGIES: Primary | ICD-10-CM

## 2018-03-16 ENCOUNTER — TELEPHONE (OUTPATIENT)
Dept: FAMILY MEDICINE | Facility: OTHER | Age: 23
End: 2018-03-16

## 2018-03-16 NOTE — TELEPHONE ENCOUNTER
I ordered this yesterday, please let me know if I did it incorrectly.  Didier Colunga MD on 3/16/2018 at 11:11 AM

## 2018-03-20 ENCOUNTER — TRANSFERRED RECORDS (OUTPATIENT)
Dept: HEALTH INFORMATION MANAGEMENT | Facility: OTHER | Age: 23
End: 2018-03-20

## 2018-03-25 ENCOUNTER — HEALTH MAINTENANCE LETTER (OUTPATIENT)
Age: 23
End: 2018-03-25

## 2018-03-28 ENCOUNTER — TRANSFERRED RECORDS (OUTPATIENT)
Dept: HEALTH INFORMATION MANAGEMENT | Facility: OTHER | Age: 23
End: 2018-03-28

## 2018-07-23 NOTE — PROGRESS NOTES
Patient Information     Patient Name  Andrey Sylvester MRN  [id number] Sex  Male   1995      Letter by Didier Colunga MD at      Author:  Dideir Colunga MD Service:  (none) Author Type:  (none)    Filed:   Date of Service:   Status:  (Other)           Andrey Sylvester   [address]  [facility] [address]          2017    Dear Mr. Sylvester:    The swallow study has come back as normal.  There were no areas of narrowing.  The specialist might order an additional test which can show how strong the muscle is along the entire esophagus.  Call me if you have questions on this test.    Sincerely,    Didier Colunga MD

## 2018-10-16 ENCOUNTER — ALLIED HEALTH/NURSE VISIT (OUTPATIENT)
Dept: FAMILY MEDICINE | Facility: OTHER | Age: 23
End: 2018-10-16
Attending: FAMILY MEDICINE

## 2018-10-16 DIAGNOSIS — Z23 NEED FOR PROPHYLACTIC VACCINATION AND INOCULATION AGAINST INFLUENZA: Primary | ICD-10-CM

## 2018-10-16 PROCEDURE — 90686 IIV4 VACC NO PRSV 0.5 ML IM: CPT

## 2018-10-16 PROCEDURE — 90471 IMMUNIZATION ADMIN: CPT

## 2018-10-16 NOTE — MR AVS SNAPSHOT
After Visit Summary   10/16/2018    Andrey Sylvester    MRN: [id number]           Patient Information     Date Of Birth          1995        Visit Information        Provider Department      10/16/2018 3:30 PM [facility] FLU [facility]        Today's Diagnoses     Need for prophylactic vaccination and inoculation against influenza    -  1       Follow-ups after your visit        Who to contact     If you have questions or need follow up information about today's clinic visit or your schedule please contact [facility] directly at [phone].  Normal or non-critical lab and imaging results will be communicated to you by MyChart, letter or phone within 4 business days after the clinic has received the results. If you do not hear from us within 7 days, please contact the clinic through MyChart or phone. If you have a critical or abnormal lab result, we will notify you by phone as soon as possible.  Submit refill requests through [employer] or call your pharmacy and they will forward the refill request to us. Please allow 3 business days for your refill to be completed.          Additional Information About Your Visit        Care EveryWhere ID     This is your Care EveryWhere ID. This could be used by other organizations to access your [facility] medical records  [id number]         Blood Pressure from Last 3 Encounters:   08/29/17 122/60   08/01/17 102/64   11/08/16 122/66    Weight from Last 3 Encounters:   08/29/17 190 lb 3.2 oz (86.3 kg)   08/01/17 189 lb 6 oz (85.9 kg)   11/08/16 196 lb (88.9 kg)              We Performed the Following     HC FLU VAC PRESRV FREE QUAD SPLIT VIR 3+YRS IM        Primary Care Provider Office Phone # Fax #    Didier Colunga [phone] [address] [facility] [address]        Equal Access to Services     NASIR GATES : clark Miranda, phil wyatt  wero boltonevelynjosef prettyJumakhloe hollis. [facility] [phone].    ATENCIÓN: Si habla ced, tiene a black disposición servicios gratuitos de asistencia lingüística. Angyame al [phone].    We comply with applicable federal civil rights laws and [place] laws. We do not discriminate on the basis of race, color, national origin, age, disability, sex, sexual orientation, or gender identity.            Thank you!     Thank you for choosing [facility] AND [facility]  for your care. Our goal is always to provide you with excellent care. Hearing back from our patients is one way we can continue to improve our services. Please take a few minutes to complete the written survey that you may receive in the mail after your visit with us. Thank you!             Your Updated Medication List - Protect others around you: Learn how to safely use, store and throw away your medicines at www.disposemymeds.org.      Notice  As of 10/16/2018  3:48 PM    You have not been prescribed any medications.

## 2018-10-16 NOTE — PROGRESS NOTES

## 2018-11-07 ENCOUNTER — TRANSFERRED RECORDS (OUTPATIENT)
Dept: HEALTH INFORMATION MANAGEMENT | Facility: OTHER | Age: 23
End: 2018-11-07

## 2020-02-07 ENCOUNTER — OFFICE VISIT (OUTPATIENT)
Dept: FAMILY MEDICINE | Facility: OTHER | Age: 25
End: 2020-02-07
Attending: FAMILY MEDICINE
Payer: COMMERCIAL

## 2020-02-07 VITALS
TEMPERATURE: 98.5 F | SYSTOLIC BLOOD PRESSURE: 125 MMHG | BODY MASS INDEX: 26.11 KG/M2 | DIASTOLIC BLOOD PRESSURE: 72 MMHG | OXYGEN SATURATION: 99 % | RESPIRATION RATE: 16 BRPM | HEART RATE: 76 BPM | HEIGHT: 72 IN | WEIGHT: 192.8 LBS

## 2020-02-07 DIAGNOSIS — Z00.00 ROUTINE GENERAL MEDICAL EXAMINATION AT A HEALTH CARE FACILITY: Primary | ICD-10-CM

## 2020-02-07 PROCEDURE — 99395 PREV VISIT EST AGE 18-39: CPT | Performed by: FAMILY MEDICINE

## 2020-02-07 PROCEDURE — G0463 HOSPITAL OUTPT CLINIC VISIT: HCPCS

## 2020-02-07 RX ORDER — PANTOPRAZOLE SODIUM 40 MG/1
TABLET, DELAYED RELEASE ORAL
COMMUNITY
Start: 2020-01-19

## 2020-02-07 ASSESSMENT — PAIN SCALES - GENERAL: PAINLEVEL: NO PAIN (0)

## 2020-02-07 ASSESSMENT — MIFFLIN-ST. JEOR: SCORE: 1907.54

## 2020-02-07 NOTE — NURSING NOTE
"Chief Complaint   Patient presents with     Physical       Initial There were no vitals taken for this visit. Estimated body mass index is 25.09 kg/m  as calculated from the following:    Height as of 8/1/17: 1.854 m (6' 1\").    Weight as of 8/29/17: 86.3 kg (190 lb 3.2 oz).  Medication Reconciliation: complete    Sapphire Cohen LPN  "

## 2020-02-07 NOTE — PROGRESS NOTES
3  SUBJECTIVE:   CC: Andrey Sylvester is an 24 year old male who presents for preventive health visit.     Healthy Habits:    Do you get at least three servings of calcium containing foods daily (dairy, green leafy vegetables, etc.)? yes    Amount of exercise or daily activities, outside of work: 1 day(s) per week    Problems taking medications regularly No    Medication side effects: No    Have you had an eye exam in the past two years? no    Do you see a dentist twice per year? yes    Do you have sleep apnea, excessive snoring or daytime drowsiness?no           Today's PHQ-2 Score:   PHQ-2 ( 1999 Pfizer) 2/7/2020   Q1: Little interest or pleasure in doing things 0   Q2: Feeling down, depressed or hopeless 0   PHQ-2 Score 0       Abuse: Current or Past(Physical, Sexual or Emotional)- No  Do you feel safe in your environment? Yes        Social History     Tobacco Use     Smoking status: Never Smoker     Smokeless tobacco: Never Used   Substance Use Topics     Alcohol use: No     Comment: rarely      If you drink alcohol do you typically have >3 drinks per day or >7 drinks per week? No                      Last PSA: No results found for: PSA    Reviewed orders with patient. Reviewed health maintenance and updated orders accordingly - Yes  Current Outpatient Medications   Medication Sig Dispense Refill     pantoprazole (PROTONIX) 40 MG EC tablet        Allergies   Allergen Reactions     Gluten Meal      Milk Protein Extract      Peanuts [Nuts]      Seasonal Allergies      Grass and pollen       Reviewed and updated as needed this visit by clinical staff  Tobacco  Allergies  Meds  Med Hx  Surg Hx  Fam Hx  Soc Hx        Reviewed and updated as needed this visit by Provider        Past Medical History:   Diagnosis Date     Other developmental disorders of scholastic skills     Special ed for learning disorder      Past Surgical History:   Procedure Laterality Date     ESOPHAGOSCOPY, GASTROSCOPY, DUODENOSCOPY (EGD),  COMBINED      To remove angeles from esophagus     ESOPHAGOSCOPY, GASTROSCOPY, DUODENOSCOPY (EGD), COMBINED      HAMBURGER REMOVAL     ESOPHAGOSCOPY, GASTROSCOPY, DUODENOSCOPY (EGD), COMBINED      9/26/14,EGD     ESOPHAGOSCOPY, GASTROSCOPY, DUODENOSCOPY (EGD), COMBINED      08/25/2017,Attempted EGDstopped for esophageal mucosal tear     OTHER SURGICAL HISTORY      ,HERNIA REPAIR,Umbilical       ROS:  CONSTITUTIONAL: NEGATIVE for fever, chills, change in weight  INTEGUMENTARY/SKIN: NEGATIVE for worrisome rashes, moles or lesions  EYES: NEGATIVE for vision changes or irritation  ENT: NEGATIVE for ear, mouth and throat problems  RESP: NEGATIVE for significant cough or SOB  CV: NEGATIVE for chest pain, palpitations or peripheral edema  GI: NEGATIVE for nausea, abdominal pain, heartburn, or change in bowel habits   male: negative for dysuria, hematuria, decreased urinary stream, erectile dysfunction, urethral discharge  MUSCULOSKELETAL: NEGATIVE for significant arthralgias or myalgia  NEURO: NEGATIVE for weakness, dizziness or paresthesias  PSYCHIATRIC: NEGATIVE for changes in mood or affect    OBJECTIVE:   /72 (BP Location: Right arm, Patient Position: Sitting, Cuff Size: Adult Regular)   Pulse 76   Temp 98.5  F (36.9  C) (Tympanic)   Resp 16   Ht 1.829 m (6')   Wt 87.5 kg (192 lb 12.8 oz)   SpO2 99%   BMI 26.15 kg/m    EXAM:  GENERAL: healthy, alert and no distress  EYES: Eyes grossly normal to inspection, PERRL and conjunctivae and sclerae normal  HENT: ear canals and TM's normal, nose and mouth without ulcers or lesions  NECK: no adenopathy, no asymmetry, masses, or scars and thyroid normal to palpation  RESP: lungs clear to auscultation - no rales, rhonchi or wheezes  CV: regular rate and rhythm, normal S1 S2, no S3 or S4, no murmur, click or rub, no peripheral edema and peripheral pulses strong  ABDOMEN: soft, nontender, no hepatosplenomegaly, no masses and bowel sounds normal  MS: no gross  musculoskeletal defects noted, no edema  SKIN: no suspicious lesions or rashes  NEURO: Normal strength and tone, mentation intact and speech normal  PSYCH: mentation appears normal, affect normal/bright    Diagnostic Test Results:  Labs reviewed in Epic    ASSESSMENT/PLAN:       ICD-10-CM    1. Routine general medical examination at a health care facility Z00.00        COUNSELING:  Reviewed preventive health counseling, as reflected in patient instructions       Regular exercise       Healthy diet/nutrition       Vision screening    Estimated body mass index is 26.15 kg/m  as calculated from the following:    Height as of this encounter: 1.829 m (6').    Weight as of this encounter: 87.5 kg (192 lb 12.8 oz).    Weight management plan: Discussed healthy diet and exercise guidelines     reports that he has never smoked. He has never used smokeless tobacco.      Counseling Resources:  ATP IV Guidelines  Pooled Cohorts Equation Calculator  FRAX Risk Assessment  ICSI Preventive Guidelines  Dietary Guidelines for Americans, 2010  USDA's MyPlate  ASA Prophylaxis  Lung CA Screening    Didier Colunga MD  [facility]

## 2020-06-27 NOTE — TELEPHONE ENCOUNTER
Patient Information     Patient Name MRN Andrey Theodore [id number] Male 1995      Telephone Encounter by Tammie aLra at 2017 11:10 AM     Author:  Tammie Lara Service:  (none) Author Type:  (none)     Filed:  2017 11:14 AM Encounter Date:  2017 Status:  Signed     :  Tammie Lara            Screening Questions for the Scheduling of Screening Colonoscopies   (If Colonoscopy is diagnostic, Provider should review the chart before scheduling.)  Are you younger than 50 or older than 80?  YES   Do you take aspirin or fish oil?  NO  (if yes, tell patient to stop 1 week prior to Colonoscopy)  Do you take warfarin (Coumadin), clopidogrel (Plavix), apixaban (Eliquis), dabigatram (Pradaxa), rivaroxaban (Xarelto) or any blood thinner? NO  Do you use oxygen at home?  NO  Do you have kidney disease? NO  Are you on dialysis? NO  Have you had a stroke or heart attack in the last year? NO  Have you had a stent in your heart or any blood vessel in the last year? NO  Have you had a transplant of any organ? NO   Have you had a colonoscopy or upper endoscopy (EGD) before? YES - EGD          When?   ? [facility]   Date of scheduled  EGD - 2017  Provider SHREE Mirza          I, Gloria Washington MD, personally saw the patient with ACP.  I have personally performed a face to face diagnostic evaluation on this patient.  I have reviewed the ACP note and agree with the history, exam, and plan of care, except as noted.

## 2020-10-01 ENCOUNTER — TRANSFERRED RECORDS (OUTPATIENT)
Dept: HEALTH INFORMATION MANAGEMENT | Facility: OTHER | Age: 25
End: 2020-10-01

## 2022-04-13 ENCOUNTER — OFFICE VISIT (OUTPATIENT)
Dept: FAMILY MEDICINE | Facility: OTHER | Age: 27
End: 2022-04-13
Attending: FAMILY MEDICINE
Payer: COMMERCIAL

## 2022-04-13 VITALS
SYSTOLIC BLOOD PRESSURE: 122 MMHG | TEMPERATURE: 98 F | HEART RATE: 81 BPM | OXYGEN SATURATION: 100 % | BODY MASS INDEX: 32.05 KG/M2 | HEIGHT: 72 IN | DIASTOLIC BLOOD PRESSURE: 72 MMHG | WEIGHT: 236.6 LBS | RESPIRATION RATE: 14 BRPM

## 2022-04-13 DIAGNOSIS — Z00.00 ROUTINE GENERAL MEDICAL EXAMINATION AT A HEALTH CARE FACILITY: Primary | ICD-10-CM

## 2022-04-13 DIAGNOSIS — L21.9 SEBORRHEIC DERMATITIS: ICD-10-CM

## 2022-04-13 PROCEDURE — 99395 PREV VISIT EST AGE 18-39: CPT | Performed by: FAMILY MEDICINE

## 2022-04-13 PROCEDURE — G0463 HOSPITAL OUTPT CLINIC VISIT: HCPCS

## 2022-04-13 RX ORDER — FLUOCINOLONE ACETONIDE 0.1 MG/ML
SOLUTION TOPICAL 2 TIMES DAILY
Qty: 90 ML | Refills: 4 | Status: SHIPPED | OUTPATIENT
Start: 2022-04-13

## 2022-04-13 ASSESSMENT — PAIN SCALES - GENERAL: PAINLEVEL: NO PAIN (0)

## 2022-04-13 NOTE — PROGRESS NOTES
SUBJECTIVE:   CC: Andrey Sylvester is an 26 year old male who presents for preventative health visit.       Patient has been advised of split billing requirements and indicates understanding: No  Healthy Habits:     Getting at least 3 servings of Calcium per day:  NO    Bi-annual eye exam:  Yes    Dental care twice a year:  NO    Sleep apnea or symptoms of sleep apnea:  None    Diet:  Gluten-free/reduced and Other    Frequency of exercise:  None    Taking medications regularly:  Yes    Medication side effects:  None    PHQ-2 Total Score: 0    Additional concerns today:  No              Today's PHQ-2 Score:   PHQ-2 ( 1999 Pfizer) 4/13/2022   Q1: Little interest or pleasure in doing things 0   Q2: Feeling down, depressed or hopeless 0   PHQ-2 Score 0   PHQ-2 Total Score (12-17 Years)- Positive if 3 or more points; Administer PHQ-A if positive -   Q1: Little interest or pleasure in doing things Not at all   Q2: Feeling down, depressed or hopeless Not at all   PHQ-2 Score 0       Abuse: Current or Past(Physical, Sexual or Emotional)- No  Do you feel safe in your environment? Yes    Have you ever done Advance Care Planning? (For example, a Health Directive, POLST, or a discussion with a medical provider or your loved ones about your wishes): No, advance care planning information given to patient to review.  Patient declined advance care planning discussion at this time.    Social History     Tobacco Use     Smoking status: Never Smoker     Smokeless tobacco: Never Used   Substance Use Topics     Alcohol use: No     Comment: rarely      If you drink alcohol do you typically have >3 drinks per day or >7 drinks per week? No    Alcohol Use 4/13/2022   Prescreen: >3 drinks/day or >7 drinks/week? Not Applicable       Last PSA: No results found for: PSA    Reviewed orders with patient. Reviewed health maintenance and updated orders accordingly - Yes  Current Outpatient Medications   Medication Sig Dispense Refill     pantoprazole  (PROTONIX) 40 MG EC tablet        Allergies   Allergen Reactions     Dairy Products [Milk Protein Extract]      Gluten Meal      Peanuts [Nuts]      Seasonal Allergies      Grass and pollen       Reviewed and updated as needed this visit by clinical staff   Tobacco  Allergies  Meds   Med Hx    Soc Hx          Reviewed and updated as needed this visit by Provider                   Past Medical History:   Diagnosis Date     Other developmental disorders of scholastic skills     Special ed for learning disorder      Past Surgical History:   Procedure Laterality Date     ESOPHAGOSCOPY, GASTROSCOPY, DUODENOSCOPY (EGD), COMBINED      To remove angeles from esophagus     ESOPHAGOSCOPY, GASTROSCOPY, DUODENOSCOPY (EGD), COMBINED      HAMBURGER REMOVAL     ESOPHAGOSCOPY, GASTROSCOPY, DUODENOSCOPY (EGD), COMBINED      9/26/14,EGD     ESOPHAGOSCOPY, GASTROSCOPY, DUODENOSCOPY (EGD), COMBINED      08/25/2017,Attempted EGDstopped for esophageal mucosal tear     OTHER SURGICAL HISTORY      ,HERNIA REPAIR,Umbilical       Review of Systems scaling on scalp for 6 months or so. Mom worried it is psoriasis.  Mild itching.  CONSTITUTIONAL: NEGATIVE for fever, chills, change in weight  INTEGUMENTARY/SKIN: NEGATIVE for worrisome rashes, moles or lesions  EYES: NEGATIVE for vision changes or irritation  ENT: NEGATIVE for ear, mouth and throat problems  RESP: NEGATIVE for significant cough or SOB  CV: NEGATIVE for chest pain, palpitations or peripheral edema  GI: NEGATIVE for nausea, abdominal pain, heartburn, or change in bowel habits   male: negative for dysuria, hematuria, decreased urinary stream, erectile dysfunction, urethral discharge  MUSCULOSKELETAL: NEGATIVE for significant arthralgias or myalgia  NEURO: NEGATIVE for weakness, dizziness or paresthesias  PSYCHIATRIC: NEGATIVE for changes in mood or affect    OBJECTIVE:   /72   Pulse 81   Temp 98  F (36.7  C)   Resp 14   Ht 1.829 m (6')   Wt 107.3 kg (236 lb 9.6  oz)   SpO2 100%   BMI 32.09 kg/m      Physical Exam  GENERAL: healthy, alert and no distress  EYES: Eyes grossly normal to inspection, PERRL and conjunctivae and sclerae normal  HENT: ear canals and TM's normal, nose and mouth without ulcers or lesions  NECK: no adenopathy, no asymmetry, masses, or scars and thyroid normal to palpation  RESP: lungs clear to auscultation - no rales, rhonchi or wheezes  CV: regular rate and rhythm, normal S1 S2, no S3 or S4, no murmur, click or rub, no peripheral edema and peripheral pulses strong  ABDOMEN: soft, nontender, no hepatosplenomegaly, no masses and bowel sounds normal  MS: no gross musculoskeletal defects noted, no edema  SKIN: upper 1/3 of forehead, into the scalp and a large area of red scaling plaque.  No nail changes, no elbow lesions.    NEURO: Normal strength and tone, mentation intact and speech normal  PSYCH: mentation appears normal, affect normal/bright        ASSESSMENT/PLAN:       ICD-10-CM    1. Routine general medical examination at a health care facility  Z00.00    2. Seborrheic dermatitis  L21.9 fluocinolone (SYNALAR) 0.01 % solution     The skin rash could be psoriasis, has no other areas involved. Will start with steroids, if not helping, consider ketoconazole shampoo, and ultimately a biopsy     Discussed with herm vaccines, he declines COVID, influenza and HPV.      COUNSELING:   Reviewed preventive health counseling, as reflected in patient instructions       Regular exercise       Healthy diet/nutrition    Estimated body mass index is 32.09 kg/m  as calculated from the following:    Height as of this encounter: 1.829 m (6').    Weight as of this encounter: 107.3 kg (236 lb 9.6 oz).     Weight management plan: Discussed healthy diet and exercise guidelines    He reports that he has never smoked. He has never used smokeless tobacco.      Counseling Resources:  ATP IV Guidelines  Pooled Cohorts Equation Calculator  FRAX Risk Assessment  ICSI Preventive  Guidelines  Dietary Guidelines for Americans, 2010  USDA's MyPlate  ASA Prophylaxis  Lung CA Screening    Didier Colunga MD  [facility]

## 2022-04-13 NOTE — NURSING NOTE
Chief Complaint   Patient presents with     Physical       Initial /72   Pulse 81   Temp 98  F (36.7  C)   Resp 14   Ht 1.829 m (6')   Wt 107.3 kg (236 lb 9.6 oz)   SpO2 100%   BMI 32.09 kg/m   Estimated body mass index is 32.09 kg/m  as calculated from the following:    Height as of this encounter: 1.829 m (6').    Weight as of this encounter: 107.3 kg (236 lb 9.6 oz).  Medication Reconciliation: complete.  FOOD SECURITY SCREENING QUESTIONS  Hunger Vital Signs:  Within the past 12 months we worried whether our food would run out before we got money to buy more. Never  Within the past 12 months the food we bought just didn't last and we didn't have money to get more. Never  Irasema King LPN 4/13/2022 8:06 AM      Irasema King LPN

## 2022-05-25 ENCOUNTER — TRANSFERRED RECORDS (OUTPATIENT)
Dept: HEALTH INFORMATION MANAGEMENT | Facility: OTHER | Age: 27
End: 2022-05-25
Payer: COMMERCIAL

## 2023-09-07 ENCOUNTER — TRANSFERRED RECORDS (OUTPATIENT)
Dept: HEALTH INFORMATION MANAGEMENT | Facility: OTHER | Age: 28
End: 2023-09-07
Payer: COMMERCIAL

## 2024-06-26 ENCOUNTER — TRANSFERRED RECORDS (OUTPATIENT)
Dept: HEALTH INFORMATION MANAGEMENT | Facility: OTHER | Age: 29
End: 2024-06-26
Payer: COMMERCIAL